# Patient Record
Sex: MALE | Race: WHITE | NOT HISPANIC OR LATINO | Employment: FULL TIME | URBAN - METROPOLITAN AREA
[De-identification: names, ages, dates, MRNs, and addresses within clinical notes are randomized per-mention and may not be internally consistent; named-entity substitution may affect disease eponyms.]

---

## 2019-12-14 ENCOUNTER — OFFICE VISIT (OUTPATIENT)
Dept: URGENT CARE | Facility: CLINIC | Age: 59
End: 2019-12-14
Payer: COMMERCIAL

## 2019-12-14 VITALS
HEIGHT: 69 IN | HEART RATE: 96 BPM | BODY MASS INDEX: 34.8 KG/M2 | SYSTOLIC BLOOD PRESSURE: 148 MMHG | TEMPERATURE: 98 F | DIASTOLIC BLOOD PRESSURE: 90 MMHG | OXYGEN SATURATION: 98 % | RESPIRATION RATE: 20 BRPM | WEIGHT: 235 LBS

## 2019-12-14 DIAGNOSIS — B96.89 ACUTE BACTERIAL SINUSITIS: Primary | ICD-10-CM

## 2019-12-14 DIAGNOSIS — R05.9 COUGH: ICD-10-CM

## 2019-12-14 DIAGNOSIS — J01.90 ACUTE BACTERIAL SINUSITIS: Primary | ICD-10-CM

## 2019-12-14 PROCEDURE — 99203 OFFICE O/P NEW LOW 30 MIN: CPT | Mod: S$GLB,,, | Performed by: INTERNAL MEDICINE

## 2019-12-14 PROCEDURE — 99203 PR OFFICE/OUTPT VISIT, NEW, LEVL III, 30-44 MIN: ICD-10-PCS | Mod: S$GLB,,, | Performed by: INTERNAL MEDICINE

## 2019-12-14 RX ORDER — LISINOPRIL 10 MG/1
10 TABLET ORAL DAILY
COMMUNITY

## 2019-12-14 RX ORDER — BENZONATATE 100 MG/1
200 CAPSULE ORAL 3 TIMES DAILY PRN
Qty: 30 CAPSULE | Refills: 1 | Status: SHIPPED | OUTPATIENT
Start: 2019-12-14

## 2019-12-14 RX ORDER — LEVOTHYROXINE SODIUM 100 UG/1
1 TABLET ORAL
COMMUNITY
Start: 2017-03-06

## 2019-12-14 RX ORDER — LEVOTHYROXINE SODIUM 125 UG/1
TABLET ORAL
Refills: 3 | COMMUNITY
Start: 2019-10-03

## 2019-12-14 RX ORDER — AMOXICILLIN AND CLAVULANATE POTASSIUM 875; 125 MG/1; MG/1
1 TABLET, FILM COATED ORAL 2 TIMES DAILY
Qty: 20 TABLET | Refills: 0 | Status: SHIPPED | OUTPATIENT
Start: 2019-12-14 | End: 2019-12-24

## 2019-12-14 RX ORDER — PREDNISONE 10 MG/1
10 TABLET ORAL DAILY
Qty: 5 TABLET | Refills: 0 | Status: SHIPPED | OUTPATIENT
Start: 2019-12-14 | End: 2019-12-19

## 2019-12-14 RX ORDER — ALLOPURINOL 300 MG/1
300 TABLET ORAL
COMMUNITY

## 2019-12-14 RX ORDER — ROSUVASTATIN CALCIUM 20 MG/1
20 TABLET, COATED ORAL
COMMUNITY

## 2019-12-14 NOTE — PROGRESS NOTES
"Subjective:       Patient ID: Travis Rich is a 59 y.o. male.    Vitals:  height is 5' 9" (1.753 m) and weight is 106.6 kg (235 lb). His oral temperature is 98.4 °F (36.9 °C). His blood pressure is 148/90 (abnormal) and his pulse is 96. His respiration is 20 and oxygen saturation is 98%.     Chief Complaint: Sinus Problem    Sinus Problem   This is a recurrent problem. The current episode started in the past 7 days. The problem has been gradually worsening since onset. There has been no fever. His pain is at a severity of 0/10. He is experiencing no pain. Associated symptoms include congestion, coughing, headaches, sinus pressure and sneezing. Pertinent negatives include no chills, diaphoresis, ear pain, shortness of breath or sore throat. Past treatments include nothing. The treatment provided no relief.       Constitution: Negative for chills, sweating, fatigue and fever.   HENT: Positive for congestion, postnasal drip, sinus pain and sinus pressure. Negative for ear pain, sore throat and voice change.    Neck: Negative for painful lymph nodes.   Eyes: Negative for eye redness.   Respiratory: Positive for cough and sputum production. Negative for chest tightness, bloody sputum, COPD, shortness of breath, stridor, wheezing and asthma.    Gastrointestinal: Negative for nausea and vomiting.   Musculoskeletal: Negative for muscle ache.   Skin: Negative for rash.   Allergic/Immunologic: Positive for sneezing. Negative for seasonal allergies and asthma.   Neurological: Positive for headaches.   Hematologic/Lymphatic: Negative for swollen lymph nodes.       Objective:      Physical Exam   Constitutional: He is oriented to person, place, and time. He appears well-developed and well-nourished. He is cooperative.  Non-toxic appearance. He does not have a sickly appearance. He does not appear ill. No distress.   HENT:   Head: Normocephalic and atraumatic.   Right Ear: Hearing, external ear and ear canal normal. Tympanic " membrane is injected. Tympanic membrane is not erythematous.   Left Ear: Hearing, external ear and ear canal normal. Tympanic membrane is injected. Tympanic membrane is not erythematous.   Nose: Mucosal edema, rhinorrhea and purulent discharge present. No nasal deformity. No epistaxis. Right sinus exhibits frontal sinus tenderness. Right sinus exhibits no maxillary sinus tenderness. Left sinus exhibits frontal sinus tenderness. Left sinus exhibits no maxillary sinus tenderness.   Mouth/Throat: Uvula is midline, oropharynx is clear and moist and mucous membranes are normal. No trismus in the jaw. Normal dentition. No uvula swelling. No oropharyngeal exudate, posterior oropharyngeal edema or posterior oropharyngeal erythema.   Eyes: Conjunctivae and lids are normal. No scleral icterus.   Neck: Trachea normal, full passive range of motion without pain and phonation normal. Neck supple. No neck rigidity. No edema and no erythema present.   Cardiovascular: Normal rate, regular rhythm, S1 normal, S2 normal, normal heart sounds, intact distal pulses and normal pulses.   No murmur heard.  Pulmonary/Chest: Effort normal and breath sounds normal. No respiratory distress. He has no decreased breath sounds. He has no wheezes. He has no rhonchi. He has no rales.   Abdominal: Normal appearance.   Musculoskeletal: Normal range of motion. He exhibits no edema or deformity.   Neurological: He is alert and oriented to person, place, and time. He exhibits normal muscle tone. Coordination normal.   Skin: Skin is warm, dry, intact, not diaphoretic and not pale.   Psychiatric: He has a normal mood and affect. His speech is normal and behavior is normal. Judgment and thought content normal. Cognition and memory are normal.   Nursing note and vitals reviewed.        Assessment:       1. Acute bacterial sinusitis    2. Cough        Plan:         Acute bacterial sinusitis  -     amoxicillin-clavulanate 875-125mg (AUGMENTIN) 875-125 mg per  tablet; Take 1 tablet by mouth 2 (two) times daily. for 10 days  Dispense: 20 tablet; Refill: 0  -     predniSONE (DELTASONE) 10 MG tablet; Take 1 tablet (10 mg total) by mouth once daily. for 5 doses  Dispense: 5 tablet; Refill: 0  -     benzonatate (TESSALON PERLES) 100 MG capsule; Take 2 capsules (200 mg total) by mouth 3 (three) times daily as needed for Cough.  Dispense: 30 capsule; Refill: 1    Cough         Take meds

## 2019-12-14 NOTE — PATIENT INSTRUCTIONS
Acute Bacterial Rhinosinusitis (ABRS)    Acute bacterial rhinosinusitis (ABRS) is an infection of your nasal cavity and sinuses. Its caused by bacteria. Acute means that youve had symptoms for less than 12 weeks.  Understanding your sinuses  The nasal cavity is the large air-filled space behind your nose. The sinuses are a group of spaces formed by the bones of your face. They connect with your nasal cavity. ABRS causes the tissue lining these spaces to become inflamed. Mucus may not drain normally. This leads to facial pain and other symptoms.  What causes ABRS?  ABRS most often follows an upper respiratory infection caused by a virus. Bacteria then infect the lining of your nasal cavity and sinuses. But you can also get ABRS if you have:  · Nasal allergies  · Long-term nasal swelling and congestion not caused by allergies  · Blockage in the nose  Symptoms of ABRS  The symptoms of ABRS may be different for each person, and can include:  · Nasal congestion  · Runny nose  · Fluid draining from the nose down the throat (postnasal drip)  · Headache  · Cough  · Pain in the sinuses  · Thick, colored fluid from the nose (mucus)  · Fever  Diagnosing ABRS  ABRS may be diagnosed if youve had an upper respiratory infection like a cold and cough for longer than 10 to 14 days. Your health care provider will ask about your symptoms and your medical history. The provider will check your vital signs, including your temperature. Youll have a physical exam. The health care provider will check your ears, nose, and throat. You likely wont need any tests. If ABRS comes back, you may have a culture or other tests.  Treatment for ABRS  Treatment may include:  · Antibiotic medicine. This is for symptoms that last for at least 10 to 14 days.  · Nasal corticosteroid medicine. Drops or spray used in the nose can lessen swelling and congestion.  · Over-the-counter pain medicine. This is to lessen sinus pain and pressure.  · Nasal  decongestant medicine. Spray or drops may help to lessen congestion. Do not use them for more than a few days.  · Salt wash (saline irrigation). This can help to loosen mucus.  Possible complications of ABRS  ABRS may come back or become long-term (chronic).  In rare cases, ABRS may cause complications such as:   · Inflamed tissue around the brain and spinal cord (meningitis)  · Inflamed tissue around the eyes (orbital cellulitis)  · Inflamed bones around the sinuses (osteitis)  These problems may need to be treated in a hospital with intravenous (IV) antibiotic medicine or surgery.  When to call the health care provider  Call your health care provider if you have any of the following:  · Symptoms that dont get better, or get worse  · Symptoms that dont get better after 3 to 5 days on antibiotics  · Trouble seeing  · Swelling around your eyes  · Confusion or trouble staying awake   Date Last Reviewed: 3/3/2015  © 8124-2148 [employer]. [address] [place] [address]. All rights reserved. This information is not intended as a substitute for professional medical care. Always follow your healthcare professional's instructions.    Please return here or go to the Emergency Department for any concerns or worsening of condition.  If you were prescribed antibiotics, please take them to completion.  If you were prescribed a narcotic medication, do not drive or operate heavy equipment or machinery while taking these medications.  Please follow up with your primary care doctor or specialist as needed.    If you  smoke, please stop smoking.    1) Motrin/advil/ibuprofen- Take Two to Three Tablets(200 mg) three Times a Day for 5 to 7 Days.  2) Mucinex D 1/2 to 1 Tablet twice a day for 5 to 7 Days.  3) Drink Hot Liquids(coffee,WATER,Tea,Hot Chocolate,or Soup) that you put in a Mug place in Microwave for 2.5 to 3 minutes CHANGE THE CUP THAT WAS USED IN THE MICROWAVE SO AS NOT TO BURN YOUR MOUTH,then sniff  the steam from the cup and sip the heated liquid TEN TO TWELVE TIMES A DAY for 5 to 7 Days.  4) These 3 things will help the antibiotics and other medications work faster and will speed your recovery!

## 2020-12-24 ENCOUNTER — HOSPITAL ENCOUNTER (OUTPATIENT)
Dept: HOSPITAL 88 - OR | Age: 60
Discharge: HOME | End: 2020-12-24
Attending: INTERNAL MEDICINE
Payer: COMMERCIAL

## 2020-12-24 VITALS — SYSTOLIC BLOOD PRESSURE: 144 MMHG | DIASTOLIC BLOOD PRESSURE: 92 MMHG

## 2020-12-24 DIAGNOSIS — Z01.810: ICD-10-CM

## 2020-12-24 DIAGNOSIS — K21.9: ICD-10-CM

## 2020-12-24 DIAGNOSIS — I10: ICD-10-CM

## 2020-12-24 DIAGNOSIS — E66.9: ICD-10-CM

## 2020-12-24 DIAGNOSIS — Z20.828: ICD-10-CM

## 2020-12-24 DIAGNOSIS — Z01.812: ICD-10-CM

## 2020-12-24 DIAGNOSIS — D12.3: ICD-10-CM

## 2020-12-24 DIAGNOSIS — Z12.11: Primary | ICD-10-CM

## 2020-12-24 DIAGNOSIS — M10.9: ICD-10-CM

## 2020-12-24 DIAGNOSIS — K64.8: ICD-10-CM

## 2020-12-24 DIAGNOSIS — E03.9: ICD-10-CM

## 2020-12-24 DIAGNOSIS — E78.5: ICD-10-CM

## 2020-12-24 DIAGNOSIS — Z71.3: ICD-10-CM

## 2020-12-24 PROCEDURE — 93005 ELECTROCARDIOGRAM TRACING: CPT

## 2020-12-24 PROCEDURE — 45385 COLONOSCOPY W/LESION REMOVAL: CPT

## 2021-03-24 ENCOUNTER — HOSPITAL ENCOUNTER (OUTPATIENT)
Dept: HOSPITAL 88 - VACCPMC | Age: 61
Discharge: HOME | End: 2021-03-24
Payer: COMMERCIAL

## 2021-03-24 DIAGNOSIS — Z20.822: ICD-10-CM

## 2021-03-24 DIAGNOSIS — Z23: Primary | ICD-10-CM

## 2021-03-24 PROCEDURE — 91301: CPT

## 2021-04-21 ENCOUNTER — HOSPITAL ENCOUNTER (OUTPATIENT)
Dept: HOSPITAL 88 - VACCPMC | Age: 61
Discharge: HOME | End: 2021-04-21
Payer: COMMERCIAL

## 2021-04-21 DIAGNOSIS — Z20.822: ICD-10-CM

## 2021-04-21 DIAGNOSIS — Z23: Primary | ICD-10-CM

## 2021-04-21 PROCEDURE — 91301: CPT

## 2022-06-08 LAB
ALBUMIN SERPL-MCNC: 4.1 G/DL (ref 3.5–5)
ALBUMIN/GLOB SERPL: 1.1 {RATIO} (ref 0.8–2)
ALP SERPL-CCNC: 57 IU/L (ref 40–150)
ALT SERPL-CCNC: 22 IU/L (ref 0–55)
ANION GAP SERPL CALC-SCNC: 17.9 MMOL/L (ref 8–16)
BASOPHILS # BLD AUTO: 0.1 10*3/UL (ref 0–0.1)
BASOPHILS NFR BLD AUTO: 0.5 % (ref 0–1)
BUN SERPL-MCNC: 23 MG/DL (ref 7–26)
BUN/CREAT SERPL: 14 (ref 6–25)
CALCIUM SERPL-MCNC: 10 MG/DL (ref 8.4–10.2)
CHLORIDE SERPL-SCNC: 100 MMOL/L (ref 98–107)
CO2 SERPL-SCNC: 28 MMOL/L (ref 22–29)
DEPRECATED NEUTROPHILS # BLD AUTO: 6.4 10*3/UL (ref 2.1–6.9)
EOSINOPHIL # BLD AUTO: 0.2 10*3/UL (ref 0–0.4)
EOSINOPHIL NFR BLD AUTO: 1.8 % (ref 0–6)
ERYTHROCYTE [DISTWIDTH] IN CORD BLOOD: 14.3 % (ref 11.7–14.4)
GLOBULIN PLAS-MCNC: 3.7 G/DL (ref 2.3–3.5)
GLUCOSE SERPLBLD-MCNC: 121 MG/DL (ref 74–118)
HCT VFR BLD AUTO: 42.1 % (ref 38.2–49.6)
HGB BLD-MCNC: 13.4 G/DL (ref 14–18)
LYMPHOCYTES # BLD: 2.1 10*3/UL (ref 1–3.2)
LYMPHOCYTES NFR BLD AUTO: 21.8 % (ref 18–39.1)
MCH RBC QN AUTO: 27.3 PG (ref 28–32)
MCHC RBC AUTO-ENTMCNC: 31.8 G/DL (ref 31–35)
MCV RBC AUTO: 85.9 FL (ref 81–99)
MONOCYTES # BLD AUTO: 0.7 10*3/UL (ref 0.2–0.8)
MONOCYTES NFR BLD AUTO: 7.7 % (ref 4.4–11.3)
NEUTS SEG NFR BLD AUTO: 67.5 % (ref 38.7–80)
PLATELET # BLD AUTO: 388 X10E3/UL (ref 140–360)
POTASSIUM SERPL-SCNC: 4.9 MMOL/L (ref 3.5–5.1)
RBC # BLD AUTO: 4.9 X10E6/UL (ref 4.3–5.7)
SODIUM SERPL-SCNC: 141 MMOL/L (ref 136–145)

## 2022-06-10 ENCOUNTER — HOSPITAL ENCOUNTER (OUTPATIENT)
Dept: HOSPITAL 88 - OR | Age: 62
Discharge: HOME | End: 2022-06-10
Attending: SURGERY
Payer: COMMERCIAL

## 2022-06-10 VITALS — DIASTOLIC BLOOD PRESSURE: 72 MMHG | SYSTOLIC BLOOD PRESSURE: 112 MMHG

## 2022-06-10 DIAGNOSIS — Z01.812: ICD-10-CM

## 2022-06-10 DIAGNOSIS — I10: ICD-10-CM

## 2022-06-10 DIAGNOSIS — Z79.899: ICD-10-CM

## 2022-06-10 DIAGNOSIS — K21.9: ICD-10-CM

## 2022-06-10 DIAGNOSIS — Z92.3: ICD-10-CM

## 2022-06-10 DIAGNOSIS — Z85.818: ICD-10-CM

## 2022-06-10 DIAGNOSIS — F41.9: ICD-10-CM

## 2022-06-10 DIAGNOSIS — J30.1: ICD-10-CM

## 2022-06-10 DIAGNOSIS — Z01.810: ICD-10-CM

## 2022-06-10 DIAGNOSIS — D17.9: Primary | ICD-10-CM

## 2022-06-10 DIAGNOSIS — E66.9: ICD-10-CM

## 2022-06-10 DIAGNOSIS — Z20.822: ICD-10-CM

## 2022-06-10 DIAGNOSIS — Z01.818: ICD-10-CM

## 2022-06-10 PROCEDURE — 85025 COMPLETE CBC W/AUTO DIFF WBC: CPT

## 2022-06-10 PROCEDURE — 93005 ELECTROCARDIOGRAM TRACING: CPT

## 2022-06-10 PROCEDURE — 36415 COLL VENOUS BLD VENIPUNCTURE: CPT

## 2022-06-10 PROCEDURE — 21933 EXC BACK TUM DEEP 5 CM/>: CPT

## 2022-06-10 PROCEDURE — 71046 X-RAY EXAM CHEST 2 VIEWS: CPT

## 2022-06-10 PROCEDURE — 88304 TISSUE EXAM BY PATHOLOGIST: CPT

## 2022-06-10 PROCEDURE — 80053 COMPREHEN METABOLIC PANEL: CPT
